# Patient Record
Sex: MALE | Race: WHITE
[De-identification: names, ages, dates, MRNs, and addresses within clinical notes are randomized per-mention and may not be internally consistent; named-entity substitution may affect disease eponyms.]

---

## 2019-03-01 ENCOUNTER — HOSPITAL ENCOUNTER (OUTPATIENT)
Dept: HOSPITAL 61 - PCVCIMAG | Age: 59
Discharge: HOME | End: 2019-03-01
Attending: INTERNAL MEDICINE
Payer: COMMERCIAL

## 2019-03-01 DIAGNOSIS — I35.1: Primary | ICD-10-CM

## 2019-03-01 DIAGNOSIS — R07.89: ICD-10-CM

## 2019-03-01 PROCEDURE — 93351 STRESS TTE COMPLETE: CPT

## 2019-03-01 PROCEDURE — 93306 TTE W/DOPPLER COMPLETE: CPT

## 2019-03-01 NOTE — PCVCIMAG
--------------- APPROVED REPORT --------------





Study performed:  03/01/2019 14:49:47



Exam:  Stress Echocardiogram

Indication: Chest Tightness

Patient Location: Echo lab

Stress Nurse: Inocencia Collado RN

Status: routine



Ht: 5 ft 5 in  

HR: 94 bpm      BP: 126/64 mmHg

Rhythm: NSR



Medical History

Medical History: Hyperlipidemia, HTN



Procedure

The patient underwent an Exercise Stress Test using the Fred 

Protocol. Blood pressure, heart rate, and EKG were monitored.

An Echocardiogram was performed by technician in four stages in quad 

fashion.  At peak stress, four selected images were obtained and 

placed side by side with resting images for comparison.



Stress Test Details

Stress Test:  Exercise stress testing was performed using a Fred 

protocol.

HR

Resting HR:            93 bpmMax Heart Rate (APMHR): 162 bpm 

Max HR Achieved:  181 bpmTarget HR (85% APMHR): 137 bpm

% of APMHR:         111

Recovery HR:            116 bpm

HR response to stress: Normal HR response to stress



BP

Resting BP:  126/64 mmHg

Max BP:       176/82 mmHg

Recovery BP:       142/78 mmHg

BP response to stress: Normal blood pressure response to 

stress.

ECG

Resting ECG:  Sinus Rhythm

Stress ECG:     Sinus Rhythm

ST Change: Non-ischemic

Recovery ECG: Sinus Rhythm



Clinical

Reason for Termination: Maximal effort

Exercise duration: 7 min 31 sec

Highest Stage Achieved: Stage 3: 3.4 mph at 14% grade. 

Exercise capacity: 10.10 METs

Overall Exercise Capacity for Age: Average



Stress ECG Conclusion

ECG: Non-ischemic

Clinical: Non-ischemic



Pre-Stress Echo

The resting Echocardiogram showed normal left ventricular 

contractility with an estimated Ejection Fraction of about >55%. 

Normal wall motion in all segments on baseline images.



Post-Stress Echo

The stress Echocardiogram showed normal left ventricular 

contractility with an estimated Ejection Fraction of about 60-65%. 

Normal augmentation of wall motion in all segments on post stress 

images.



Clinical

No clinical or ECG evidence for ischemia.



Conclusion

Clinical Response:  Non-ischemic

Exercise Capacity:  Average

Stress ECG Response:  Non-ischemic

Stress Echo Images:  Non-ischemic

The left ventricle is normal in size and wall thickness in both the 

rest and stress images.



Other Information

Study Quality: Good



<Conclusion>

The left ventricle is normal in size and wall thickness in both the 

rest and stress images.

## 2019-03-01 NOTE — PCVCIMAG
--------------- APPROVED REPORT --------------





Study performed:  2019 14:03:05



EXAM: Comprehensive 2D, Doppler, and color-flow 

Echocardiogram

Patient Location: Echo lab

Status:  routine



BSA:         1.83

HR: 79 bpmBP:          126/64 mmHg

Rhythm: NSR



Other Information 

Study Quality: Good



Risk Factors: 

Cardiac Risk Factors:  Hyperlipidemia, HTN



Indications

Chest Tightness



2D Dimensions

IVSd:  8.74 (7-11mm)LVOT Diam:  20.00 (18-24mm) 

LVDd:  38.55 mm

PWd:  9.39 (7-11mm)Ascending Ao:  30.58 (22-36mm)

LVDs:  28.11 (25-40mm)

Left Atrium:  34.72 (27-40mm)

Aortic Root:  29.07 mm

LV Single Plane 4CH:  62.88 %

LV Single Plane 2CH:  69.05 %

Biplane EF:  66.9 %



Volumes

Left Atrial Volume (Systole)

Single Plane 4CH:  50.06 mLSingle Plane 2CH:  36.03 mL

LA ESV Index:  24.00 mL/m2



Aortic Valve

AoV Peak Marcial.:  1.38 m/s

AO Peak Gr.:  7.59 mmHgLVOT Max P.11 mmHg

LVOT Max V:  1.24 m/s

JAUN Vmax: 2.74 cm2

AI Vmax:  3.90 m/s

AI Overton:  2.66 m/s2

AI PHT:  426.00 ms



Mitral Valve

E/A Ratio:  1.3

MV Decel. Time:  148.92 ms

MV E Max Marcial.:  0.80 m/s

MV A Marcial.:  0.64 m/s

IVRT:  65.74 ms



TDI

E/Lateral E':  6.67E/Medial E':  8.89

Medial E' Marcial.:  0.09 m/s

Lateral E' Marcial.:  0.12 m/s



Pulmonary Valve

PV Peak Marcial.:  0.98 m/sPV Peak Gr.:  3.82 mmHg



Pulmonary Vein

P Vein S:    0.68 m/sP Vein A:  0.36 m/s

P Vein D:   0.51 m/sP Vein A Dur.:  79.6 msec

P Vein S/D Ratio:  1.33



Tricuspid Valve

TR Peak Marcial.:  2.19 m/sRAP Estimate:  7.00 mmHg

TR Peak Gr.:  19.21 mmHg

PA Pressure:  26.00 mmHg



Left Ventricle

The left ventricle is normal size. There is normal LV segmental wall 

motion. There is normal left ventricular wall thickness. Left 

ventricular systolic function is normal. The left ventricular 

ejection fraction is within the normal range. LVEF is 60-65%. The 

left ventricular diastolic function is normal.



Right Ventricle

The right ventricle is normal size. The right ventricular systolic 

function is normal.



Atria

The left atrium size is normal. The right atrium size is 

normal.



Aortic Valve

The aortic valve is normal in structure. Mild aortic regurgitation. 

There is no aortic valvular stenosis.



Mitral Valve

The mitral valve is normal in structure. Trace mitral regurgitation. 

No evidence of mitral valve stenosis.



Tricuspid Valve

The tricuspid valve is normal in structure. Trace tricuspid 

regurgitation. Pulmonary artery pressure is 26 mmHg.



Pulmonic Valve

The pulmonary valve is normal in structure. Trace pulmonic 

regurgitation.



Great Vessels

The aortic root is normal in size. IVC is normal in size and 

collapses >50% with inspiration.



Pericardium

There is no pericardial effusion.



<Conclusion>

The left ventricle is normal size.

There is normal left ventricular wall thickness.

Left ventricular systolic function is normal.

The left ventricular diastolic function is normal.

The right ventricle is normal size.

The left atrium size is normal.

The right atrium size is normal.

Mild aortic regurgitation.

Trace mitral regurgitation.

Trace tricuspid regurgitation. Pulmonary artery pressure is 26 mmHg.